# Patient Record
Sex: MALE | Race: WHITE | NOT HISPANIC OR LATINO | Employment: FULL TIME | URBAN - METROPOLITAN AREA
[De-identification: names, ages, dates, MRNs, and addresses within clinical notes are randomized per-mention and may not be internally consistent; named-entity substitution may affect disease eponyms.]

---

## 2017-08-22 ENCOUNTER — OFFICE VISIT (OUTPATIENT)
Dept: FAMILY MEDICINE | Facility: CLINIC | Age: 47
End: 2017-08-22
Payer: COMMERCIAL

## 2017-08-22 VITALS
BODY MASS INDEX: 25.97 KG/M2 | HEIGHT: 71 IN | WEIGHT: 185.5 LBS | OXYGEN SATURATION: 98 % | SYSTOLIC BLOOD PRESSURE: 130 MMHG | HEART RATE: 95 BPM | TEMPERATURE: 97 F | DIASTOLIC BLOOD PRESSURE: 74 MMHG

## 2017-08-22 DIAGNOSIS — Z00.00 HEALTH CARE MAINTENANCE: ICD-10-CM

## 2017-08-22 DIAGNOSIS — R19.00 RIGHT FLANK MASS: ICD-10-CM

## 2017-08-22 DIAGNOSIS — R22.1 MASS OF RIGHT SIDE OF NECK: Primary | ICD-10-CM

## 2017-08-22 PROCEDURE — 99203 OFFICE O/P NEW LOW 30 MIN: CPT | Mod: S$GLB,,, | Performed by: FAMILY MEDICINE

## 2017-08-22 PROCEDURE — 99999 PR PBB SHADOW E&M-NEW PATIENT-LVL IV: CPT | Mod: PBBFAC,,, | Performed by: FAMILY MEDICINE

## 2017-08-22 PROCEDURE — 3008F BODY MASS INDEX DOCD: CPT | Mod: S$GLB,,, | Performed by: FAMILY MEDICINE

## 2017-08-22 NOTE — PROGRESS NOTES
"Subjective:       Patient ID: Srini Wilks is a 46 y.o. male.    Chief Complaint: Other (lump in neck (right side), hand weakness)      HPI Comments:     He is establishing care on the occasion of concern he has about a fullness on the right side of his neck.  Has been there for about 10-12 months.  There is no pain or other symptoms associated with it.  He has a similar lesion on his right flank/side which has been there about 6 months.    He also is concerned about his right hand: He noticed that he has a hard time putting up 3 fingers by bringing the thumb and pinky finger together.  The problem seems to come from the pinky finger having decreased mobility to oppose and meet thumb.  He has no other functional problems with his hand including writing etc.  He is right-handed. He's had no injuries or pain in the joints of the hand.    Past medical history significant for broken left leg when he was younger.  He has no hospitalizations or other surgeries, and takes no medications..  He is a nonsmoker but does use "dip".      Review of Systems   Constitutional: Negative for activity change, fatigue and fever.   HENT: Negative for sore throat.    Respiratory: Negative for cough and shortness of breath.    Cardiovascular: Negative for chest pain and leg swelling.   Gastrointestinal: Negative for abdominal distention, abdominal pain, diarrhea and nausea.   Genitourinary: Negative for difficulty urinating.   Musculoskeletal: Negative for arthralgias and myalgias.   Neurological: Negative for dizziness and headaches.       Objective:      Physical Exam   Constitutional: He is oriented to person, place, and time. He appears well-developed and well-nourished. No distress.   HENT:   Head: Normocephalic.   Neck: No tracheal deviation present. No thyromegaly present.       Cardiovascular: Normal rate, regular rhythm and normal heart sounds.    No murmur heard.  Pulmonary/Chest: Effort normal and breath sounds normal. He " has no wheezes.   Abdominal: Soft. There is no tenderness.   Musculoskeletal: He exhibits no edema or tenderness.   3X1 inch superficial mass located overlying lateral/inferior Right rib cage   Lymphadenopathy:     He has no cervical adenopathy.   Neurological: He is alert and oriented to person, place, and time.   Skin: Skin is warm and dry. He is not diaphoretic.   Psychiatric: He has a normal mood and affect. His behavior is normal. Judgment and thought content normal.   Nursing note and vitals reviewed.      Assessment:       1. Mass of right side of neck    2. Right flank mass    3. Health care maintenance        Plan:   Mass of right side of neck  Comments:  likely lipoma. No discrete mass palpable.  Orders:  -     US Soft Tissue Head Neck Thyroid; Future; Expected date: 08/22/2017    Right flank mass  Comments:  likely lipoma  Orders:  -     Cancel: US Chest Mediastinum; Future; Expected date: 08/22/2017    Health care maintenance  Comments:  will f/u for annual physical soon or by age 48

## 2017-08-28 ENCOUNTER — TELEPHONE (OUTPATIENT)
Dept: RADIOLOGY | Facility: HOSPITAL | Age: 47
End: 2017-08-28

## 2017-08-29 ENCOUNTER — APPOINTMENT (OUTPATIENT)
Dept: RADIOLOGY | Facility: HOSPITAL | Age: 47
End: 2017-08-29
Attending: FAMILY MEDICINE
Payer: COMMERCIAL

## 2017-08-29 DIAGNOSIS — R22.1 MASS OF RIGHT SIDE OF NECK: ICD-10-CM

## 2017-08-29 PROCEDURE — 76536 US EXAM OF HEAD AND NECK: CPT | Mod: 26,,, | Performed by: RADIOLOGY

## 2017-08-29 PROCEDURE — 76536 US EXAM OF HEAD AND NECK: CPT | Mod: TC,PO

## 2017-08-31 DIAGNOSIS — R22.1 NECK FULLNESS: ICD-10-CM

## 2017-09-08 ENCOUNTER — PATIENT MESSAGE (OUTPATIENT)
Dept: FAMILY MEDICINE | Facility: CLINIC | Age: 47
End: 2017-09-08

## 2017-09-12 ENCOUNTER — LAB VISIT (OUTPATIENT)
Dept: LAB | Facility: HOSPITAL | Age: 47
End: 2017-09-12
Attending: SURGERY
Payer: COMMERCIAL

## 2017-09-12 ENCOUNTER — OFFICE VISIT (OUTPATIENT)
Dept: SURGERY | Facility: CLINIC | Age: 47
End: 2017-09-12
Payer: COMMERCIAL

## 2017-09-12 VITALS
WEIGHT: 185.19 LBS | SYSTOLIC BLOOD PRESSURE: 128 MMHG | DIASTOLIC BLOOD PRESSURE: 83 MMHG | HEART RATE: 67 BPM | BODY MASS INDEX: 25.83 KG/M2 | TEMPERATURE: 98 F

## 2017-09-12 DIAGNOSIS — D17.0 LIPOMA OF NECK: ICD-10-CM

## 2017-09-12 DIAGNOSIS — D17.0 LIPOMA OF NECK: Primary | ICD-10-CM

## 2017-09-12 PROCEDURE — 99999 PR PBB SHADOW E&M-EST. PATIENT-LVL III: CPT | Mod: PBBFAC,,, | Performed by: SURGERY

## 2017-09-12 PROCEDURE — 3008F BODY MASS INDEX DOCD: CPT | Mod: S$GLB,,, | Performed by: SURGERY

## 2017-09-12 PROCEDURE — 99202 OFFICE O/P NEW SF 15 MIN: CPT | Mod: S$GLB,,, | Performed by: SURGERY

## 2017-09-12 PROCEDURE — 80048 BASIC METABOLIC PNL TOTAL CA: CPT

## 2017-09-12 PROCEDURE — 36415 COLL VENOUS BLD VENIPUNCTURE: CPT | Mod: PO

## 2017-09-12 NOTE — PROGRESS NOTES
Srini is 46-year-old white male patient who is being seen for the evaluation of visible or palpable mass on both side of the neck.  According to the patient, he developed this mass about 8 weeks ago.  He was working in the yard and been doing heavy lifting all day.  He noticed these lesions without any symptoms.  He denies of any fever and chill.  He denies of any weight loss.  He denies of any night sweats.  Of a note, he does tobacco chewing since he was 10.  The physical exam confirms presence of soft fatty tumor-like lesions on both side of the neck located at the root of the neck anteriorly.  The ultrasound images were negative for any fullness.  The soft tissue CT scan of the neck will be ordered today and then will evaluate those lesions.  The plan was discussed with the patient.  Total time approximately 20 minute was spent for this patient, and more than 50% of that was spent for treatment planning and counseling.    Gonzalo Ramirez    Answers for HPI/ROS submitted by the patient on 9/12/2017   activity change: No  unexpected weight change: No  neck pain: No  hearing loss: No  rhinorrhea: No  trouble swallowing: No  eye discharge: No  visual disturbance: No  chest tightness: No  wheezing: No  chest pain: No  palpitations: No  blood in stool: No  constipation: No  vomiting: No  diarrhea: No  polydipsia: No  polyuria: No  difficulty urinating: No  urgency: No  hematuria: No  joint swelling: No  arthralgias: No  headaches: No  weakness: Yes  confusion: No  dysphoric mood: No

## 2017-09-12 NOTE — LETTER
September 12, 2017      Aashish Mcmanus MD  [address] [facility] [place] [address]           [facility] General Surgery  [address] [address]  Phone: [phone]  Fax: [phone]          Patient: Srini Wilks   MR Number: [id number]   YOB: 1970   Date of Visit: 9/12/2017       Dear Dr. Aashish Mcmanus:    Thank you for referring Srini Wilks to me for evaluation. Attached you will find relevant portions of my assessment and plan of care.    If you have questions, please do not hesitate to call me. I look forward to following Srini Wilks along with you.    Sincerely,    Gonzalo Ramirez MD    Enclosure  CC:  No Recipients    If you would like to receive this communication electronically, please contact [email or web address] or [phone] to request more information on [employer] access.    For providers and/or their staff who would like to refer a patient to [facility], please contact us through our one-stop-shop provider referral line, [facility], at [phone].    If you feel you have received this communication in error or would no longer like to receive these types of communications, please e-mail [email or web address]

## 2017-09-13 LAB
ANION GAP SERPL CALC-SCNC: 11 MMOL/L
BUN SERPL-MCNC: 10 MG/DL
CALCIUM SERPL-MCNC: 8.7 MG/DL
CHLORIDE SERPL-SCNC: 101 MMOL/L
CO2 SERPL-SCNC: 26 MMOL/L
CREAT SERPL-MCNC: 1 MG/DL
EST. GFR  (AFRICAN AMERICAN): >60 ML/MIN/1.73 M^2
EST. GFR  (NON AFRICAN AMERICAN): >60 ML/MIN/1.73 M^2
GLUCOSE SERPL-MCNC: 83 MG/DL
POTASSIUM SERPL-SCNC: 3.6 MMOL/L
SODIUM SERPL-SCNC: 138 MMOL/L

## 2017-09-26 ENCOUNTER — HOSPITAL ENCOUNTER (OUTPATIENT)
Dept: RADIOLOGY | Facility: HOSPITAL | Age: 47
Discharge: HOME | End: 2017-09-26
Attending: SURGERY
Payer: COMMERCIAL

## 2017-09-26 DIAGNOSIS — D17.0 LIPOMA OF NECK: ICD-10-CM

## 2017-09-26 PROCEDURE — 25500020 PHARM REV CODE 255: Performed by: SURGERY

## 2017-09-26 PROCEDURE — 70492 CT SFT TSUE NCK W/O & W/DYE: CPT | Mod: TC

## 2017-09-26 RX ADMIN — IOHEXOL 75 ML: 350 INJECTION, SOLUTION INTRAVENOUS at 12:09

## 2017-09-27 ENCOUNTER — TELEPHONE (OUTPATIENT)
Dept: SURGERY | Facility: CLINIC | Age: 47
End: 2017-09-27

## 2017-09-27 NOTE — TELEPHONE ENCOUNTER
----- Message from Gonzalo Ramirez MD sent at 9/27/2017 10:51 AM CDT -----  The result is benign. No further action is needed.

## 2021-07-01 ENCOUNTER — PATIENT MESSAGE (OUTPATIENT)
Dept: ADMINISTRATIVE | Facility: OTHER | Age: 51
End: 2021-07-01